# Patient Record
(demographics unavailable — no encounter records)

---

## 2024-11-01 NOTE — HISTORY OF PRESENT ILLNESS
[Influenza] : Influenza [COVID-19] : COVID-19 [FreeTextEntry1] : here for flu and covid, doing well, no fever,

## 2025-01-17 NOTE — DISCUSSION/SUMMARY
[FreeTextEntry1] : # nasopharyngitis - strep negative in the office; throat culture will be sent out - 4 plex negative in the office; discussed prophylactic dosage to initiate, switch to therapeutic if febrile - supportive care advised: humidifier, hydration, honey if 1 year and older, bulb suctioning, positioning. etc.  - RTC for persistent high fever (longer than 3 days), breathing difficulty, poor oral intake, decreased UOP, or any new concerns/symptoms # impacted cerumen - debrox recommended

## 2025-01-17 NOTE — PHYSICAL EXAM
[Cerumen in canal] : cerumen in canal [NL] : moves all extremities x4, warm, well perfused x4 [Dry] : dry [de-identified] : dry on the right hand and thumb

## 2025-01-17 NOTE — HISTORY OF PRESENT ILLNESS
[de-identified] : flu check [FreeTextEntry6] : - yesterday, her brother confirmed positive for flu a and b, strep - this morning, she complains of sore throat - otherwise well and afebile - recently allergist checked her with testing, all negative

## 2025-03-19 NOTE — HISTORY OF PRESENT ILLNESS
[Mother] : mother [Normal] : Normal [Yes] : Patient goes to dentist yearly [Toothpaste] : Primary Fluoride Source: Toothpaste [No] : Not at  exposure [Carbon Monoxide Detectors] : Carbon monoxide detectors [Smoke Detectors] : Smoke detectors [NO] : No [FreeTextEntry7] : 5 yo for well exam, doing well, brother with strep so mom wants to make sure she does not have strep [de-identified] : eats well

## 2025-03-19 NOTE — PHYSICAL EXAM

## 2025-03-19 NOTE — HISTORY OF PRESENT ILLNESS
[Mother] : mother [Normal] : Normal [Yes] : Patient goes to dentist yearly [Toothpaste] : Primary Fluoride Source: Toothpaste [No] : Not at  exposure [Carbon Monoxide Detectors] : Carbon monoxide detectors [Smoke Detectors] : Smoke detectors [NO] : No [FreeTextEntry7] : 3 yo for well exam, doing well, brother with strep so mom wants to make sure she does not have strep [de-identified] : eats well

## 2025-03-19 NOTE — PHYSICAL EXAM

## 2025-03-19 NOTE — DEVELOPMENTAL MILESTONES
[Goes to the bathroom and has] : goes to bathroom and has bowel movement by self [Dresses and undresses without] : dresses and undresses without much help [Plays make-believe] : plays make-believe [Uses 4-word sentences] : uses 4-word sentences [Uses words that are 100%] : uses words that are 100% intelligible to strangers [Tells a story from a book] : tells a story from a book [Climbs stairs, alternating feet] : climbs stairs, alternating feet without support [Grasps a pencil with thumb and] : grasps a pencil with thumb and fingers instead of fist